# Patient Record
(demographics unavailable — no encounter records)

---

## 2024-12-12 NOTE — HISTORY OF PRESENT ILLNESS
[TextBox_4] :  10/29/2024 bone density: T- 1.8 at L1-4 (Z0.0); femoral neck -1.5 (Z0.1); total hip -1.3. Osteopenia with a 10-year fracture risk of 9.2 and 1.1%. [BoneDensityDate] : 10/29/2024 [TextBox_37] : T- 1.8 at L1-4 (Z0.0); femoral neck -1.5 (Z0.1); total hip -1.3. Osteopenia with a 10-year fracture risk of 9.2 and 1.1%.